# Patient Record
Sex: MALE | Race: WHITE | NOT HISPANIC OR LATINO | URBAN - METROPOLITAN AREA
[De-identification: names, ages, dates, MRNs, and addresses within clinical notes are randomized per-mention and may not be internally consistent; named-entity substitution may affect disease eponyms.]

---

## 2023-11-28 ENCOUNTER — LAB OUTSIDE AN ENCOUNTER (OUTPATIENT)
Dept: URBAN - METROPOLITAN AREA CLINIC 115 | Facility: CLINIC | Age: 33
End: 2023-11-28

## 2023-11-28 ENCOUNTER — DASHBOARD ENCOUNTERS (OUTPATIENT)
Age: 33
End: 2023-11-28

## 2023-11-28 ENCOUNTER — OFFICE VISIT (OUTPATIENT)
Dept: URBAN - METROPOLITAN AREA CLINIC 115 | Facility: CLINIC | Age: 33
End: 2023-11-28
Payer: COMMERCIAL

## 2023-11-28 VITALS
DIASTOLIC BLOOD PRESSURE: 82 MMHG | HEIGHT: 71 IN | BODY MASS INDEX: 34.19 KG/M2 | TEMPERATURE: 98.1 F | SYSTOLIC BLOOD PRESSURE: 124 MMHG | WEIGHT: 244.2 LBS | HEART RATE: 99 BPM

## 2023-11-28 DIAGNOSIS — R10.30 LOWER ABDOMINAL PAIN: ICD-10-CM

## 2023-11-28 DIAGNOSIS — K62.5 RECTAL BLEEDING: ICD-10-CM

## 2023-11-28 DIAGNOSIS — K64.0 BLEEDING GRADE I HEMORRHOIDS: ICD-10-CM

## 2023-11-28 DIAGNOSIS — R10.33 PERIUMBILICAL PAIN: ICD-10-CM

## 2023-11-28 PROCEDURE — 46600 DIAGNOSTIC ANOSCOPY SPX: CPT | Performed by: INTERNAL MEDICINE

## 2023-11-28 PROCEDURE — 99203 OFFICE O/P NEW LOW 30 MIN: CPT | Performed by: INTERNAL MEDICINE

## 2023-11-28 PROCEDURE — 46611 ANOSCOPY: CPT | Performed by: INTERNAL MEDICINE

## 2023-11-28 RX ORDER — METHYLPHENIDATE HYDROCHLORIDE 5 MG/1
1 TABLET ON AN EMPTY STOMACH TABLET ORAL TWICE A DAY
Status: ACTIVE | COMMUNITY

## 2023-11-28 RX ORDER — COCOA BUTTER, PHENYLEPHRINE HYDROCHLORIDE 2211; 6.25 MG/1; MG/1
AS DIRECTED SUPPOSITORY RECTAL TWICE DAILY
Qty: 20 | Refills: 0 | OUTPATIENT
Start: 2023-11-28 | End: 2023-12-08

## 2023-11-28 RX ORDER — TRAZODONE HYDROCHLORIDE 150 MG/1
1 TABLET AT BEDTIME TABLET ORAL ONCE A DAY
Status: ACTIVE | COMMUNITY

## 2023-11-28 RX ORDER — BUSPIRONE HYDROCHLORIDE 15 MG/1
1 TABLET TABLET ORAL TWICE A DAY
Status: ACTIVE | COMMUNITY

## 2023-11-28 RX ORDER — VENLAFAXINE HYDROCHLORIDE 225 MG/1
1 TABLET WITH FOOD TABLET ORAL ONCE A DAY
Status: ACTIVE | COMMUNITY

## 2023-11-28 RX ORDER — OMEPRAZOLE 40 MG/1
1 CAPSULE 30 MINUTES BEFORE MORNING MEAL CAPSULE, DELAYED RELEASE ORAL ONCE A DAY
Status: ACTIVE | COMMUNITY

## 2023-11-28 NOTE — PHYSICAL EXAM GASTROINTESTINAL
Abdomen , soft, tender in the RLQ, nondistended , no guarding or rigidity , no masses palpable , no rebound
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
## 2023-11-29 LAB
A/G RATIO: 1.7
ABSOLUTE BASOPHILS: 97
ABSOLUTE EOSINOPHILS: 170
ABSOLUTE LYMPHOCYTES: 1709
ABSOLUTE MONOCYTES: 705
ABSOLUTE NEUTROPHILS: 5419
ALBUMIN: 4.7
ALKALINE PHOSPHATASE: 61
ALT (SGPT): 50
AST (SGOT): 23
BASOPHILS: 1.2
BILIRUBIN, TOTAL: 0.3
BUN/CREATININE RATIO: (no result)
BUN: 16
CALCIUM: 9.9
CARBON DIOXIDE, TOTAL: 26
CHLORIDE: 102
CREATININE: 1.19
EGFR: 83
EOSINOPHILS: 2.1
GLOBULIN, TOTAL: 2.8
GLUCOSE: 86
HEMATOCRIT: 42
HEMOGLOBIN: 13.9
LYMPHOCYTES: 21.1
MCH: 26.4
MCHC: 33.1
MCV: 79.7
MONOCYTES: 8.7
MPV: 10.3
NEUTROPHILS: 66.9
PLATELET COUNT: 472
POTASSIUM: 4.4
PROTEIN, TOTAL: 7.5
RDW: 15.8
RED BLOOD CELL COUNT: 5.27
SODIUM: 139
WHITE BLOOD CELL COUNT: 8.1

## 2024-01-16 ENCOUNTER — OFFICE VISIT (OUTPATIENT)
Dept: URBAN - METROPOLITAN AREA CLINIC 115 | Facility: CLINIC | Age: 34
End: 2024-01-16